# Patient Record
Sex: MALE | Race: ASIAN | URBAN - METROPOLITAN AREA
[De-identification: names, ages, dates, MRNs, and addresses within clinical notes are randomized per-mention and may not be internally consistent; named-entity substitution may affect disease eponyms.]

---

## 2023-10-26 ENCOUNTER — OFFICE VISIT (OUTPATIENT)
Dept: FAMILY MEDICINE CLINIC | Facility: CLINIC | Age: 38
End: 2023-10-26

## 2023-10-26 VITALS
WEIGHT: 207.81 LBS | TEMPERATURE: 98 F | HEIGHT: 69.05 IN | BODY MASS INDEX: 30.78 KG/M2 | SYSTOLIC BLOOD PRESSURE: 124 MMHG | HEART RATE: 79 BPM | OXYGEN SATURATION: 98 % | DIASTOLIC BLOOD PRESSURE: 82 MMHG | RESPIRATION RATE: 18 BRPM

## 2023-10-26 DIAGNOSIS — E55.9 VITAMIN D DEFICIENCY: ICD-10-CM

## 2023-10-26 DIAGNOSIS — Z00.00 ANNUAL PHYSICAL EXAM: Primary | ICD-10-CM

## 2023-10-26 DIAGNOSIS — Z13.21 SCREENING FOR ENDOCRINE, NUTRITIONAL, METABOLIC AND IMMUNITY DISORDER: ICD-10-CM

## 2023-10-26 DIAGNOSIS — Z78.9 VEGETARIAN DIET: ICD-10-CM

## 2023-10-26 DIAGNOSIS — Z13.228 SCREENING FOR ENDOCRINE, NUTRITIONAL, METABOLIC AND IMMUNITY DISORDER: ICD-10-CM

## 2023-10-26 DIAGNOSIS — Z13.29 SCREENING FOR ENDOCRINE, NUTRITIONAL, METABOLIC AND IMMUNITY DISORDER: ICD-10-CM

## 2023-10-26 DIAGNOSIS — Z23 NEED FOR VACCINATION: ICD-10-CM

## 2023-10-26 DIAGNOSIS — E66.9 OBESITY (BMI 30-39.9): ICD-10-CM

## 2023-10-26 DIAGNOSIS — Z13.6 SCREENING FOR ISCHEMIC HEART DISEASE: ICD-10-CM

## 2023-10-26 DIAGNOSIS — R73.01 ELEVATED FASTING GLUCOSE: Primary | ICD-10-CM

## 2023-10-26 DIAGNOSIS — Z13.0 SCREENING FOR ENDOCRINE, NUTRITIONAL, METABOLIC AND IMMUNITY DISORDER: ICD-10-CM

## 2023-10-26 DIAGNOSIS — R73.01 ELEVATED FASTING GLUCOSE: ICD-10-CM

## 2023-10-26 LAB
ALBUMIN SERPL-MCNC: 4 G/DL (ref 3.4–5)
ALBUMIN/GLOB SERPL: 1.1 {RATIO} (ref 1–2)
ALP LIVER SERPL-CCNC: 56 U/L
ALT SERPL-CCNC: 50 U/L
ANION GAP SERPL CALC-SCNC: 3 MMOL/L (ref 0–18)
AST SERPL-CCNC: 17 U/L (ref 15–37)
BASOPHILS # BLD AUTO: 0.02 X10(3) UL (ref 0–0.2)
BASOPHILS NFR BLD AUTO: 0.2 %
BILIRUB SERPL-MCNC: 0.8 MG/DL (ref 0.1–2)
BUN BLD-MCNC: 13 MG/DL (ref 7–18)
CALCIUM BLD-MCNC: 9.3 MG/DL (ref 8.5–10.1)
CHLORIDE SERPL-SCNC: 106 MMOL/L (ref 98–112)
CHOLEST SERPL-MCNC: 198 MG/DL (ref ?–200)
CO2 SERPL-SCNC: 28 MMOL/L (ref 21–32)
CREAT BLD-MCNC: 1.14 MG/DL
EGFRCR SERPLBLD CKD-EPI 2021: 85 ML/MIN/1.73M2 (ref 60–?)
EOSINOPHIL # BLD AUTO: 0.42 X10(3) UL (ref 0–0.7)
EOSINOPHIL NFR BLD AUTO: 5.1 %
ERYTHROCYTE [DISTWIDTH] IN BLOOD BY AUTOMATED COUNT: 13.2 %
FASTING PATIENT LIPID ANSWER: YES
FASTING STATUS PATIENT QL REPORTED: YES
GLOBULIN PLAS-MCNC: 3.5 G/DL (ref 2.8–4.4)
GLUCOSE BLD-MCNC: 122 MG/DL (ref 70–99)
HCT VFR BLD AUTO: 44.1 %
HDLC SERPL-MCNC: 50 MG/DL (ref 40–59)
HGB BLD-MCNC: 15.3 G/DL
IMM GRANULOCYTES # BLD AUTO: 0.02 X10(3) UL (ref 0–1)
IMM GRANULOCYTES NFR BLD: 0.2 %
LDLC SERPL CALC-MCNC: 118 MG/DL (ref ?–100)
LYMPHOCYTES # BLD AUTO: 2.79 X10(3) UL (ref 1–4)
LYMPHOCYTES NFR BLD AUTO: 33.9 %
MCH RBC QN AUTO: 29.1 PG (ref 26–34)
MCHC RBC AUTO-ENTMCNC: 34.7 G/DL (ref 31–37)
MCV RBC AUTO: 83.8 FL
MONOCYTES # BLD AUTO: 0.48 X10(3) UL (ref 0.1–1)
MONOCYTES NFR BLD AUTO: 5.8 %
NEUTROPHILS # BLD AUTO: 4.49 X10 (3) UL (ref 1.5–7.7)
NEUTROPHILS # BLD AUTO: 4.49 X10(3) UL (ref 1.5–7.7)
NEUTROPHILS NFR BLD AUTO: 54.8 %
NONHDLC SERPL-MCNC: 148 MG/DL (ref ?–130)
OSMOLALITY SERPL CALC.SUM OF ELEC: 285 MOSM/KG (ref 275–295)
PLATELET # BLD AUTO: 176 10(3)UL (ref 150–450)
POTASSIUM SERPL-SCNC: 3.9 MMOL/L (ref 3.5–5.1)
PROT SERPL-MCNC: 7.5 G/DL (ref 6.4–8.2)
RBC # BLD AUTO: 5.26 X10(6)UL
SODIUM SERPL-SCNC: 137 MMOL/L (ref 136–145)
TRIGL SERPL-MCNC: 169 MG/DL (ref 30–149)
TSI SER-ACNC: 2.8 MIU/ML (ref 0.36–3.74)
VIT B12 SERPL-MCNC: 505 PG/ML (ref 193–986)
VLDLC SERPL CALC-MCNC: 30 MG/DL (ref 0–30)
WBC # BLD AUTO: 8.2 X10(3) UL (ref 4–11)

## 2023-10-26 PROCEDURE — 80061 LIPID PANEL: CPT | Performed by: FAMILY MEDICINE

## 2023-10-26 PROCEDURE — 82607 VITAMIN B-12: CPT | Performed by: FAMILY MEDICINE

## 2023-10-26 PROCEDURE — 99385 PREV VISIT NEW AGE 18-39: CPT | Performed by: FAMILY MEDICINE

## 2023-10-26 PROCEDURE — 83036 HEMOGLOBIN GLYCOSYLATED A1C: CPT | Performed by: FAMILY MEDICINE

## 2023-10-26 PROCEDURE — 3074F SYST BP LT 130 MM HG: CPT | Performed by: FAMILY MEDICINE

## 2023-10-26 PROCEDURE — 80050 GENERAL HEALTH PANEL: CPT | Performed by: FAMILY MEDICINE

## 2023-10-26 PROCEDURE — 3008F BODY MASS INDEX DOCD: CPT | Performed by: FAMILY MEDICINE

## 2023-10-26 PROCEDURE — 82306 VITAMIN D 25 HYDROXY: CPT | Performed by: FAMILY MEDICINE

## 2023-10-26 PROCEDURE — 3079F DIAST BP 80-89 MM HG: CPT | Performed by: FAMILY MEDICINE

## 2023-10-26 PROCEDURE — 36415 COLL VENOUS BLD VENIPUNCTURE: CPT | Performed by: FAMILY MEDICINE

## 2023-10-26 NOTE — PROGRESS NOTES
Patient came in for draw of ordered fasting labs. Patient drawn out of right AC, x 1 attempt and tolerated well. Light green and lavender tube drawn.

## 2023-10-27 DIAGNOSIS — E78.2 MIXED HYPERLIPIDEMIA: ICD-10-CM

## 2023-10-27 DIAGNOSIS — R73.03 PREDIABETES: Primary | ICD-10-CM

## 2023-10-27 LAB
EST. AVERAGE GLUCOSE BLD GHB EST-MCNC: 126 MG/DL (ref 68–126)
HBA1C MFR BLD: 6 % (ref ?–5.7)
VIT D+METAB SERPL-MCNC: 30.8 NG/ML (ref 30–100)

## 2023-10-28 PROBLEM — E78.2 MIXED HYPERLIPIDEMIA: Status: ACTIVE | Noted: 2023-10-28

## 2023-10-28 PROBLEM — R73.03 PREDIABETES: Status: ACTIVE | Noted: 2023-10-28

## 2024-05-11 ENCOUNTER — HOSPITAL ENCOUNTER (OUTPATIENT)
Age: 39
Discharge: HOME | End: 2024-05-11
Payer: COMMERCIAL

## 2024-05-11 VITALS
DIASTOLIC BLOOD PRESSURE: 83 MMHG | TEMPERATURE: 99 F | SYSTOLIC BLOOD PRESSURE: 123 MMHG | RESPIRATION RATE: 18 BRPM | HEART RATE: 66 BPM | OXYGEN SATURATION: 98 %

## 2024-05-11 DIAGNOSIS — S61.217A LACERATION OF LEFT LITTLE FINGER WITHOUT FOREIGN BODY WITHOUT DAMAGE TO NAIL, INITIAL ENCOUNTER: Primary | ICD-10-CM

## 2024-05-11 PROCEDURE — 12001 RPR S/N/AX/GEN/TRNK 2.5CM/<: CPT | Performed by: NURSE PRACTITIONER

## 2024-05-11 PROCEDURE — 99203 OFFICE O/P NEW LOW 30 MIN: CPT | Performed by: NURSE PRACTITIONER

## 2024-05-11 RX ORDER — ANASTROZOLE 1 MG/1
1 TABLET ORAL DAILY
COMMUNITY

## 2024-05-11 NOTE — ED PROVIDER NOTES
Patient Seen in: [facility]      History     Chief Complaint   Patient presents with    Laceration/Abrasion     Stated Complaint: Lt finger cut    Subjective:   38-year-old male presents after accidentally cutting his left pinky finger with a new kitchen knife about 30 minutes ago.    Patient was trying to cut a bhaskar.  Last Tdap in 2022.    Patient denies numbness, tingling, or painful range of motion.    The history is provided by the patient and the spouse.       Objective:   History reviewed. No pertinent past medical history.           Past Surgical History:   Procedure Laterality Date    Tonsillectomy      1986    [place] teeth removed  10/24/2023                Social History     Socioeconomic History    Marital status:    Tobacco Use    Smoking status: Never    Smokeless tobacco: Never   Substance and Sexual Activity    Alcohol use: Not Currently     Comment: rare, 1x/month    Drug use: Never              Review of Systems   Skin:  Positive for wound.   All other systems reviewed and are negative.      Positive for stated complaint: Lt finger cut  Other systems are as noted in HPI.  Constitutional and vital signs reviewed.      All other systems reviewed and negative except as noted above.    Physical Exam     ED Triage Vitals [05/11/24 1432]   /83   Pulse 66   Resp 18   Temp 98.5 °F (36.9 °C)   Temp src Temporal   SpO2 98 %   O2 Device None (Room air)       Current Vitals:   Vital Signs  BP: 123/83  Pulse: 66  Resp: 18  Temp: 98.5 °F (36.9 °C)  Temp src: Temporal    Oxygen Therapy  SpO2: 98 %  O2 Device: None (Room air)            Physical Exam  Constitutional:       Appearance: Normal appearance.   Musculoskeletal:      Left hand: Laceration (1.3 cm linear laceration to 5th finger over DIP. no tendon involvement.) present. No bony tenderness. Normal strength. Normal sensation. Normal capillary refill.   Skin:     General: Skin is warm and dry.   Neurological:      Mental  Status: He is alert.   Psychiatric:         Mood and Affect: Mood normal.               ED Course   Labs Reviewed - No data to display  Laceration Repair:  Consent Obtained: Yes, Patient: Verbal  Anesthesia: 1% lidocaine  Laceration details:    Location:        Length:   1.3  cm  Wound Exploration: entire depth of wound probed and visualized  Material: Nylon sutures  --Number: 5.0  --Repair: 3 sutures  Hemostasis: Achieved  Dressing: Topical antibiotic, gauze/telfa  Pt tolerated the procedure well.     MDM   38-year-old male presents after accidentally cutting his left pinky finger with a new kitchen knife about 30 minutes ago.    On exam, pt in NAD, vitals normal.  Laceration repaired; 3 sutures placed.   Last Tdap in 2022.   Wound care discussed and signs of infection.   Return in 10 days for suture removal.       Medical Decision Making      Disposition and Plan     Clinical Impression:  1. Laceration of left little finger without foreign body without damage to nail, initial encounter         Disposition:  Discharge  5/11/2024  3:09 pm    Follow-up:  No follow-up provider specified.        Medications Prescribed:  Discharge Medication List as of 5/11/2024  3:10 PM

## 2024-05-11 NOTE — ED INITIAL ASSESSMENT (HPI)
Pt c/o lac to 5th left finger, rpt cut finger w/ knife while cutting fruit, happened 30 min prior to arrival

## 2024-05-11 NOTE — DISCHARGE INSTRUCTIONS
Keep wound dry for 24 hours.   Tomorrow, removed dressing gently cleanse the area with gentle soap and water.   Apply Bacitracin (thin layer) twice a day and a band aide.    Allow time for wound to \"breathe\" by leaving the band aid off when at home resting.   Return for suture removal in 9-10 days.   Return sooner if any fever, chills, purulent drainage or surrounding redness.

## 2024-05-23 ENCOUNTER — PATIENT MESSAGE (OUTPATIENT)
Dept: FAMILY MEDICINE CLINIC | Facility: CLINIC | Age: 39
End: 2024-05-23

## 2024-05-23 NOTE — TELEPHONE ENCOUNTER
From: Vannessa Norton  To: Tess Orta  Sent: 5/23/2024 2:30 PM CDT  Subject: Can you please add an order for Vit D and testosterone    Hey   I recently got tested for TSH and it looked like it dropped by 50 pt in 3 months . I wanted to get tested at another lab and wanted to make sure it is not a miss .

## 2024-05-23 NOTE — TELEPHONE ENCOUNTER
Pt has appt with PCP on 5/29/2024.     Current lab orders placed for lipid panel and A1C.    Last Vit D level on 10/27/2023 within normal ranges.

## 2024-05-30 ENCOUNTER — OFFICE VISIT (OUTPATIENT)
Dept: FAMILY MEDICINE CLINIC | Facility: CLINIC | Age: 39
End: 2024-05-30

## 2024-05-30 VITALS
SYSTOLIC BLOOD PRESSURE: 126 MMHG | HEIGHT: 69 IN | DIASTOLIC BLOOD PRESSURE: 78 MMHG | RESPIRATION RATE: 18 BRPM | TEMPERATURE: 97 F | OXYGEN SATURATION: 97 % | HEART RATE: 86 BPM | BODY MASS INDEX: 30.16 KG/M2 | WEIGHT: 203.63 LBS

## 2024-05-30 DIAGNOSIS — R79.89 LOW TESTOSTERONE: Primary | ICD-10-CM

## 2024-05-30 DIAGNOSIS — S99.921S FOOT INJURY, RIGHT, SEQUELA: ICD-10-CM

## 2024-05-30 PROCEDURE — 3008F BODY MASS INDEX DOCD: CPT | Performed by: FAMILY MEDICINE

## 2024-05-30 PROCEDURE — 99213 OFFICE O/P EST LOW 20 MIN: CPT | Performed by: FAMILY MEDICINE

## 2024-05-30 PROCEDURE — 3074F SYST BP LT 130 MM HG: CPT | Performed by: FAMILY MEDICINE

## 2024-05-30 PROCEDURE — 3078F DIAST BP <80 MM HG: CPT | Performed by: FAMILY MEDICINE

## 2024-05-30 NOTE — PROGRESS NOTES
CHIEF COMPLAINT:   Chief Complaint   Patient presents with    Referral     endocrinologist    Foot Injury     Per patient step on a nail about 3-4 week ago          HPI:     Vannessa Norton is a 38 year old male presents for low testosterone and foot injury.    Low Testosterone: pt has been seeing Dr. Walker at [facility] and noted to have low testosterone levels. Pt is taking Anastrazole and Clomid per Dr. Jones (Urology), but he is concerned about whe his testosterone levels have fluctuated from Total T of 260-350 over the last 2 yrs.  He states he has \"low T symptoms.\"    Right foot injury: he was working on his patio/deck a few weeks ago and stepped onto a nail int he center of his right food.  He knew he was uTD with Tdap and took care of the wound himself at home.  He is able to ambulate and bear weight. He has no F/C .              HISTORY:  History reviewed. No pertinent past medical history.   Past Surgical History:   Procedure Laterality Date    Tonsillectomy      1986    [place] teeth removed  10/24/2023      Family History   Problem Relation Age of Onset    Hypertension Mother     Hypertension Father     Stroke Maternal Grandfather     Diabetes Paternal Grandmother     Hypertension Paternal Grandfather     Diabetes Paternal Grandfather     Colon Cancer Neg     Prostate Cancer Neg       Social History:   Social History     Socioeconomic History    Marital status:    Tobacco Use    Smoking status: Former     Current packs/day: 1.00     Average packs/day: 1 pack/day for 2.4 years (2.4 ttl pk-yrs)     Types: Cigarettes     Start date: 1/1/2022     Passive exposure: Never    Smokeless tobacco: Never   Vaping Use    Vaping status: Never Used   Substance and Sexual Activity    Alcohol use: Yes     Comment: rare, 1x/month    Drug use: Not Currently        Medications (Active prior to today's visit):  Current Outpatient Medications   Medication Sig Dispense Refill    clomiPHENE Citrate  50 MG Oral Tab Take 1 tablet (50 mg total) by mouth every other day.      anastrozole 1 MG Oral Tab tab Take 1 tablet (1 mg total) by mouth daily.         Allergies:  No Known Allergies    PSFH elements reviewed from today and agreed except as otherwise stated in HPI.  ROS:     Review of Systems   Constitutional:  Negative for appetite change, chills, fatigue and fever.   Skin:  Positive for wound. Negative for color change and rash.   Neurological:  Negative for weakness and numbness.         Pertinent positives and negatives noted in the the HPI.    PHYSICAL EXAM:   /78 (BP Location: Left arm, Patient Position: Sitting, Cuff Size: adult)   Pulse 86   Temp 96.9 °F (36.1 °C) (Temporal)   Resp 18   Ht 5' 9\" (1.753 m)   Wt 203 lb 9.6 oz (92.4 kg)   SpO2 97%   BMI 30.07 kg/m²   Vital signs reviewed.Appears stated age, well groomed.  Physical Exam  HENT:      Head: Normocephalic.   Cardiovascular:      Rate and Rhythm: Normal rate and regular rhythm.      Pulses: Normal pulses.      Heart sounds: Normal heart sounds.   Pulmonary:      Effort: Pulmonary effort is normal.      Breath sounds: Normal breath sounds.   Abdominal:      General: Bowel sounds are normal.   Musculoskeletal:      Right lower leg: No edema.      Left lower leg: No edema.   Skin:     General: Skin is warm and dry.      Findings: No erythema.      Comments: Right foot, center of plantar surface with puncture site, palpable scar tissue, no erythema, no edema, no drainage.    Neurological:      Mental Status: He is alert and oriented to person, place, and time.   Psychiatric:         Mood and Affect: Mood normal.         Behavior: Behavior normal.          LABS     No visits with results within 2 Month(s) from this visit.   Latest known visit with results is:   Office Visit on 10/26/2023   Component Date Value    Glucose 10/26/2023 122 (H)     Sodium 10/26/2023 137     Potassium 10/26/2023 3.9     Chloride 10/26/2023 106     CO2 10/26/2023  28.0     Anion Gap 10/26/2023 3     BUN 10/26/2023 13     Creatinine 10/26/2023 1.14     Calcium, Total 10/26/2023 9.3     Calculated Osmolality 10/26/2023 285     eGFR-Cr 10/26/2023 85     AST 10/26/2023 17     ALT 10/26/2023 50     Alkaline Phosphatase 10/26/2023 56     Bilirubin, Total 10/26/2023 0.8     Total Protein 10/26/2023 7.5     Albumin 10/26/2023 4.0     Globulin  10/26/2023 3.5     A/G Ratio 10/26/2023 1.1     Patient Fasting for CMP? 10/26/2023 Yes     Cholesterol, Total 10/26/2023 198     HDL Cholesterol 10/26/2023 50     Triglycerides 10/26/2023 169 (H)     LDL Cholesterol 10/26/2023 118 (H)     VLDL 10/26/2023 30     Non HDL Chol 10/26/2023 148 (H)     Patient Fasting for Lipi* 10/26/2023 Yes     TSH 10/26/2023 2.800     Vitamin B12 10/26/2023 505     WBC 10/26/2023 8.2     RBC 10/26/2023 5.26     HGB 10/26/2023 15.3     HCT 10/26/2023 44.1     PLT 10/26/2023 176.0     MCV 10/26/2023 83.8     MCH 10/26/2023 29.1     MCHC 10/26/2023 34.7     RDW 10/26/2023 13.2     Neutrophil Absolute Prel* 10/26/2023 4.49     Neutrophil Absolute 10/26/2023 4.49     Lymphocyte Absolute 10/26/2023 2.79     Monocyte Absolute 10/26/2023 0.48     Eosinophil Absolute 10/26/2023 0.42     Basophil Absolute 10/26/2023 0.02     Immature Granulocyte Abs* 10/26/2023 0.02     Neutrophil % 10/26/2023 54.8     Lymphocyte % 10/26/2023 33.9     Monocyte % 10/26/2023 5.8     Eosinophil % 10/26/2023 5.1     Basophil % 10/26/2023 0.2     Immature Granulocyte % 10/26/2023 0.2     Vitamin D, 25OH, Total 10/26/2023 30.8     HgbA1C 10/26/2023 6.0 (H)     Estimated Average Glucose 10/26/2023 126       REVIEWED THIS VISIT  ASSESSMENT/PLAN:   38 year old male with    1. Low testosterone  - recommend he see Endocrinology  - he has PPO, gave him names of recommended Endos, he will call if he needs a referral    2. Foot injury, right, sequela  - stepped on nail at home  - UTD with Tdap (10/01/2022)  - healed well      Meds This Visit:  Requested  Prescriptions      No prescriptions requested or ordered in this encounter       Health Maintenance:  Health Maintenance   Topic Date Due    COVID-19 Vaccine (4 - 2023-24 season) 10/26/2024 (Originally 9/1/2023)    Influenza Vaccine (Season Ended) 10/01/2024    Annual Physical  10/26/2024    DTaP,Tdap,and Td Vaccines (2 - Td or Tdap) 10/01/2032    Annual Depression Screening  Completed    Pneumococcal Vaccine: Birth to 64yrs  Aged Out         Patient/Caregiver Education: There are no barriers to learning. Medical education done.   Outcome: Patient verbalizes understanding and agrees with plan. Advised to call or RTC if symptoms persist or worsen.    Problem List:     Patient Active Problem List   Diagnosis    Prediabetes    Mixed hyperlipidemia       Imaging & Referrals:  None     5/30/2024  Tess Orta MD      Patient understands plan and follow-up.  Return in about 6 months (around 11/30/2024) for annual physical.

## 2024-10-28 ENCOUNTER — OFFICE VISIT (OUTPATIENT)
Dept: FAMILY MEDICINE CLINIC | Facility: CLINIC | Age: 39
End: 2024-10-28
Payer: COMMERCIAL

## 2024-10-28 VITALS
WEIGHT: 210.81 LBS | HEART RATE: 75 BPM | OXYGEN SATURATION: 96 % | TEMPERATURE: 98 F | BODY MASS INDEX: 31.95 KG/M2 | DIASTOLIC BLOOD PRESSURE: 76 MMHG | SYSTOLIC BLOOD PRESSURE: 124 MMHG | RESPIRATION RATE: 18 BRPM | HEIGHT: 68 IN

## 2024-10-28 DIAGNOSIS — R73.03 PREDIABETES: ICD-10-CM

## 2024-10-28 DIAGNOSIS — E55.9 VITAMIN D DEFICIENCY: ICD-10-CM

## 2024-10-28 DIAGNOSIS — Z13.228 SCREENING FOR ENDOCRINE, NUTRITIONAL, METABOLIC AND IMMUNITY DISORDER: ICD-10-CM

## 2024-10-28 DIAGNOSIS — Z78.9 VEGETARIAN DIET: ICD-10-CM

## 2024-10-28 DIAGNOSIS — Z13.21 SCREENING FOR ENDOCRINE, NUTRITIONAL, METABOLIC AND IMMUNITY DISORDER: ICD-10-CM

## 2024-10-28 DIAGNOSIS — Z00.00 ANNUAL PHYSICAL EXAM: Primary | ICD-10-CM

## 2024-10-28 DIAGNOSIS — Z13.29 SCREENING FOR ENDOCRINE, NUTRITIONAL, METABOLIC AND IMMUNITY DISORDER: ICD-10-CM

## 2024-10-28 DIAGNOSIS — R68.82 LOW LIBIDO: ICD-10-CM

## 2024-10-28 DIAGNOSIS — Z13.6 SCREENING FOR ISCHEMIC HEART DISEASE: ICD-10-CM

## 2024-10-28 DIAGNOSIS — Z13.0 SCREENING FOR ENDOCRINE, NUTRITIONAL, METABOLIC AND IMMUNITY DISORDER: ICD-10-CM

## 2024-10-28 PROCEDURE — 99395 PREV VISIT EST AGE 18-39: CPT | Performed by: FAMILY MEDICINE

## 2024-10-28 PROCEDURE — 3074F SYST BP LT 130 MM HG: CPT | Performed by: FAMILY MEDICINE

## 2024-10-28 PROCEDURE — 3008F BODY MASS INDEX DOCD: CPT | Performed by: FAMILY MEDICINE

## 2024-10-28 PROCEDURE — 3078F DIAST BP <80 MM HG: CPT | Performed by: FAMILY MEDICINE

## 2024-10-30 NOTE — PROGRESS NOTES
Chief Complaint   Patient presents with    Physical     Annual exam        HPI:   Vannessa Norton is a 38 year old male who presents for a complete physical exam.   Patient is present for complete physical. Feels very well. Up to date with dental visits.No hearing problems. Vaccinations: none given today    Annual physical:  Overall, pt states he feels well.     Sexually active: monogamous  Last PSA: n/a  Last Colon Ca screening: n/a    Exercise: walking 30 min almost daily  Diet: vegetarian    Vit D def: is due to recheck Vit D level    Prediabetes: 10/2023 A1c was 6.0.  Is diet controlled.  Has no polyuria and no polydipsia.     Low Testosterone: pt has been seeing Dr. Walker at [facility] and noted to have low testosterone levels. Pt is taking Anastrazole and Clomid per Dr. Jones (Urology), but he is concerned about whe his testosterone levels have fluctuated from Total T of 260-350 over the last 2 yrs.  He states he has \"low T symptoms.\"           Wt Readings from Last 3 Encounters:   10/28/24 210 lb 12.8 oz (95.6 kg)   05/30/24 203 lb 9.6 oz (92.4 kg)   10/26/23 207 lb 12.8 oz (94.3 kg)      BP Readings from Last 3 Encounters:   10/28/24 124/76   05/30/24 126/78   05/11/24 123/83       Cholesterol, Total (mg/dL)   Date Value   10/26/2023 198     HDL Cholesterol (mg/dL)   Date Value   10/26/2023 50     LDL Cholesterol (mg/dL)   Date Value   10/26/2023 118 (H)     AST (U/L)   Date Value   10/26/2023 17     ALT (U/L)   Date Value   10/26/2023 50      Current Outpatient Medications   Medication Sig Dispense Refill    clomiPHENE Citrate 50 MG Oral Tab Take 1 tablet (50 mg total) by mouth every other day.      anastrozole 1 MG Oral Tab tab Take 1 tablet (1 mg total) by mouth daily.        History reviewed. No pertinent past medical history.   Past Surgical History:   Procedure Laterality Date    Tonsillectomy      1986    [place] teeth removed  10/24/2023      Family History   Problem Relation  Age of Onset    Hypertension Mother     Hypertension Father     Stroke Maternal Grandfather     Diabetes Paternal Grandmother     Hypertension Paternal Grandfather     Diabetes Paternal Grandfather     Colon Cancer Neg     Prostate Cancer Neg       Social History     Socioeconomic History    Marital status:    Tobacco Use    Smoking status: Former     Current packs/day: 1.00     Average packs/day: 1 pack/day for 2.8 years (2.8 ttl pk-yrs)     Types: Cigarettes     Start date: 1/1/2022     Passive exposure: Never    Smokeless tobacco: Never   Vaping Use    Vaping status: Never Used   Substance and Sexual Activity    Alcohol use: Yes     Comment: rare, 1x/month    Drug use: Not Currently     Exercise: walking.  Diet: vegetarian   Allergies:  Allergies[1]     REVIEW OF SYSTEMS:     Review of Systems   Constitutional:  Negative for appetite change, chills, fatigue, fever and unexpected weight change.   Gastrointestinal:  Negative for abdominal pain, blood in stool, constipation, diarrhea, nausea and vomiting.   Endocrine: Negative for polydipsia and polyuria.   Genitourinary:  Negative for dysuria.        EXAM:   /76 (BP Location: Left arm, Patient Position: Sitting, Cuff Size: adult)   Pulse 75   Temp 98.2 °F (36.8 °C) (Temporal)   Resp 18   Ht 5' 8\" (1.727 m)   Wt 210 lb 12.8 oz (95.6 kg)   SpO2 96%   BMI 32.05 kg/m²   GENERAL: WD/WN in no acute distress.   HEENT: PERRLA and EOMI.  OP moist no lesions. TM normal, canals normal.  NECK: is supple,thyroid- normal.  LUNGS: are clear to auscultation bilaterally, with no wheeze, rhonchi, or rales.  HEART: is RRR.  S1, S2, with no murmurs.    ABDOMEN: is soft, NT/ND with no HSM.  No rebound or guarding, NABS.     EXAM: deferred  RECTAL EXAM: deferred  EXTREMITIES: are symmetric with no cyanosis, clubbing, or edema.    SKIN: is unremarkable without rashes.    NEURO: no focal abnormalities, and reflexes coordination and gait normal and symmetric.    MUSK/SKEL: Normal muscles tones, no joint abnormalities, full ROM of all extremities.    back- normal curves, no scoliosis, normal gait,  No joint or muscle abnormalities.  PSYCH: pt is alert, oriented x 3, normal affect.    ASSESSMENT AND PLAN:   Vannessa Norton is a 38 year old male who presents for a complete physical exam.     1. Annual physical exam  Routine labs ordered today, await results. Counseled pt on healthy lifestyle changes. Vaccines today: none given today  .     - CBC With Differential With Platelet; Future  - Comp Metabolic Panel; Future  - Lipid Panel; Future  - TSH W Reflex To Free T4; Future    2. Prediabetes  - 10/2023 A1c was 6.0, new onset at the time  - due to recheck today  - cont low carb diet/low sugar diet    - Hemoglobin A1C [E]; Future    3. Low libido  - recommend he see Endocrinology at previous visit  - is undergoing low T treatment [facility]rolog[facility]  - recheck testosterone level    - Testosterone, Total Free, Male [E]; Future    4. Vitamin D deficiency  - due to recheck Vit D level    - Vitamin D [E]; Future    5. Vegetarian diet  - check Vit B12 level    - Vitamin B12 [E]; Future    6. Screening for endocrine, nutritional, metabolic and immunity disorder    - CBC With Differential With Platelet; Future  - Comp Metabolic Panel; Future  - TSH W Reflex To Free T4; Future    7. Screening for ischemic heart disease    - Lipid Panel; Future      Pt's weight is Body mass index is 32.05 kg/m²., recommended low fat diet and aerobic exercise 30 minutes three times weekly.   The patient indicates understanding of these issues and agrees to the plan.    Return for follow-up depending on lab results.           [1] No Known Allergies